# Patient Record
Sex: FEMALE | Race: NATIVE HAWAIIAN OR OTHER PACIFIC ISLANDER | NOT HISPANIC OR LATINO
[De-identification: names, ages, dates, MRNs, and addresses within clinical notes are randomized per-mention and may not be internally consistent; named-entity substitution may affect disease eponyms.]

---

## 2017-11-03 ENCOUNTER — TRANSCRIPTION ENCOUNTER (OUTPATIENT)
Age: 22
End: 2017-11-03

## 2018-08-13 ENCOUNTER — OUTPATIENT (OUTPATIENT)
Dept: OUTPATIENT SERVICES | Facility: HOSPITAL | Age: 23
LOS: 1 days | Discharge: HOME | End: 2018-08-13

## 2018-08-13 DIAGNOSIS — Z76.1 ENCOUNTER FOR HEALTH SUPERVISION AND CARE OF FOUNDLING: ICD-10-CM

## 2018-10-19 ENCOUNTER — OUTPATIENT (OUTPATIENT)
Dept: OUTPATIENT SERVICES | Facility: HOSPITAL | Age: 23
LOS: 1 days | Discharge: HOME | End: 2018-10-19

## 2018-10-19 DIAGNOSIS — Z76.1 ENCOUNTER FOR HEALTH SUPERVISION AND CARE OF FOUNDLING: ICD-10-CM

## 2018-10-19 DIAGNOSIS — K29.71 GASTRITIS, UNSPECIFIED, WITH BLEEDING: ICD-10-CM

## 2018-11-03 ENCOUNTER — TRANSCRIPTION ENCOUNTER (OUTPATIENT)
Age: 23
End: 2018-11-03

## 2018-11-12 ENCOUNTER — OUTPATIENT (OUTPATIENT)
Dept: OUTPATIENT SERVICES | Facility: HOSPITAL | Age: 23
LOS: 1 days | Discharge: HOME | End: 2018-11-12

## 2018-11-12 DIAGNOSIS — M54.5 LOW BACK PAIN: ICD-10-CM

## 2018-11-12 DIAGNOSIS — M25.512 PAIN IN LEFT SHOULDER: ICD-10-CM

## 2018-11-12 DIAGNOSIS — M54.2 CERVICALGIA: ICD-10-CM

## 2021-02-01 ENCOUNTER — APPOINTMENT (OUTPATIENT)
Dept: BREAST CENTER | Facility: CLINIC | Age: 26
End: 2021-02-01

## 2021-02-18 ENCOUNTER — APPOINTMENT (OUTPATIENT)
Dept: BREAST CENTER | Facility: CLINIC | Age: 26
End: 2021-02-18
Payer: COMMERCIAL

## 2021-02-18 VITALS
SYSTOLIC BLOOD PRESSURE: 107 MMHG | OXYGEN SATURATION: 97 % | HEART RATE: 66 BPM | TEMPERATURE: 98.1 F | WEIGHT: 170 LBS | HEIGHT: 63 IN | DIASTOLIC BLOOD PRESSURE: 70 MMHG | BODY MASS INDEX: 30.12 KG/M2

## 2021-02-18 DIAGNOSIS — Z80.3 FAMILY HISTORY OF MALIGNANT NEOPLASM OF BREAST: ICD-10-CM

## 2021-02-18 DIAGNOSIS — N63.0 UNSPECIFIED LUMP IN UNSPECIFIED BREAST: ICD-10-CM

## 2021-02-18 DIAGNOSIS — Z78.9 OTHER SPECIFIED HEALTH STATUS: ICD-10-CM

## 2021-02-18 PROCEDURE — 99203 OFFICE O/P NEW LOW 30 MIN: CPT

## 2021-02-18 PROCEDURE — 99072 ADDL SUPL MATRL&STAF TM PHE: CPT

## 2021-02-18 RX ORDER — IRON/IRON ASP GLY/FA/MV-MIN 38 125-25-1MG
TABLET ORAL
Refills: 0 | Status: ACTIVE | COMMUNITY

## 2021-02-18 RX ORDER — CHROMIUM 200 MCG
TABLET ORAL
Refills: 0 | Status: ACTIVE | COMMUNITY

## 2021-02-18 NOTE — PAST MEDICAL HISTORY
[Menstruating] : The patient is menstruating [Menarche Age ____] : age at menarche was [unfilled] [Approximately ___] : the LMP was approximately [unfilled] [Total Preg ___] : G[unfilled] [Regular Cycle Intervals] : have been regular [FreeTextEntry6] : NA [FreeTextEntry7] : NA [FreeTextEntry8] : NA

## 2021-02-23 PROBLEM — Z78.9 NON-SMOKER: Status: ACTIVE | Noted: 2021-02-18

## 2021-03-04 ENCOUNTER — NON-APPOINTMENT (OUTPATIENT)
Age: 26
End: 2021-03-04

## 2021-03-08 ENCOUNTER — NON-APPOINTMENT (OUTPATIENT)
Age: 26
End: 2021-03-08

## 2021-08-03 ENCOUNTER — NON-APPOINTMENT (OUTPATIENT)
Age: 26
End: 2021-08-03

## 2021-08-19 ENCOUNTER — NON-APPOINTMENT (OUTPATIENT)
Age: 26
End: 2021-08-19

## 2021-08-19 ENCOUNTER — APPOINTMENT (OUTPATIENT)
Dept: BREAST CENTER | Facility: CLINIC | Age: 26
End: 2021-08-19
Payer: COMMERCIAL

## 2021-08-19 VITALS
BODY MASS INDEX: 30.12 KG/M2 | DIASTOLIC BLOOD PRESSURE: 83 MMHG | SYSTOLIC BLOOD PRESSURE: 125 MMHG | HEIGHT: 63 IN | TEMPERATURE: 97.8 F | WEIGHT: 170 LBS | OXYGEN SATURATION: 98 % | HEART RATE: 81 BPM

## 2021-08-19 PROCEDURE — 99213 OFFICE O/P EST LOW 20 MIN: CPT

## 2021-08-19 NOTE — PAST MEDICAL HISTORY
[Menstruating] : The patient is menstruating [Menarche Age ____] : age at menarche was [unfilled] [Approximately ___] : the LMP was approximately [unfilled] [Regular Cycle Intervals] : have been regular [Total Preg ___] : G[unfilled] [FreeTextEntry7] : NA [FreeTextEntry6] : NA [FreeTextEntry8] : NA

## 2021-08-19 NOTE — PHYSICAL EXAM
[Normocephalic] : normocephalic [Atraumatic] : atraumatic [Supple] : supple [No Supraclavicular Adenopathy] : no supraclavicular adenopathy [Examined in the supine and seated position] : examined in the supine and seated position [No dominant masses] : no dominant masses left breast [No Nipple Retraction] : no left nipple retraction [No Nipple Discharge] : no right nipple discharge [No Axillary Lymphadenopathy] : no left axillary lymphadenopathy [No Edema] : no edema [No Rashes] : no rashes [No Ulceration] : no ulceration

## 2021-08-19 NOTE — HISTORY OF PRESENT ILLNESS
[FreeTextEntry1] : Kerline is a 26 year old female who presents for follow-up high risk evaluation regarding a strong family history of breast cancer, her paternal aunt and paternal grandmother were diagnosed with breast cancer at ages 45 and 50 respectively. No genetic testing has been completed. She denies any palpable abnormalities, no skin changes/dimpling, no nipple discharge bilaterally. \par \par SERGEY 22.5% (prior to genetic testing) was recalculated to 21.2% with negative pathogenic mutations (she was positive for a VUS in BRCA2)\par \par Will start MRIs now.

## 2021-08-19 NOTE — DATA REVIEWED
[No studies available for review at this time.] : No studies available for review at this time. [FreeTextEntry1] : 2/25/21 ([facility]) genetic testing revealed a VUS in BRCA2. \par \par 3/2/21 [facility] bilateral breast US: Negative bilateral US. BI-RADS 1.

## 2021-10-04 ENCOUNTER — NON-APPOINTMENT (OUTPATIENT)
Age: 26
End: 2021-10-04

## 2022-03-04 ENCOUNTER — TRANSCRIPTION ENCOUNTER (OUTPATIENT)
Age: 27
End: 2022-03-04

## 2022-03-09 ENCOUNTER — NON-APPOINTMENT (OUTPATIENT)
Age: 27
End: 2022-03-09

## 2022-04-20 ENCOUNTER — NON-APPOINTMENT (OUTPATIENT)
Age: 27
End: 2022-04-20

## 2022-04-21 ENCOUNTER — APPOINTMENT (OUTPATIENT)
Dept: BREAST CENTER | Facility: CLINIC | Age: 27
End: 2022-04-21
Payer: MEDICAID

## 2022-04-21 VITALS
HEIGHT: 63 IN | OXYGEN SATURATION: 100 % | SYSTOLIC BLOOD PRESSURE: 122 MMHG | HEART RATE: 64 BPM | WEIGHT: 170 LBS | BODY MASS INDEX: 30.12 KG/M2 | DIASTOLIC BLOOD PRESSURE: 79 MMHG | TEMPERATURE: 98 F

## 2022-04-21 PROCEDURE — 99213 OFFICE O/P EST LOW 20 MIN: CPT

## 2022-04-21 NOTE — PAST MEDICAL HISTORY
[Menstruating] : The patient is menstruating [Menarche Age ____] : age at menarche was [unfilled] [Approximately ___] : the LMP was approximately [unfilled] [Regular Cycle Intervals] : have been regular [Total Preg ___] : G[unfilled] [FreeTextEntry6] : NA [FreeTextEntry7] : NA [FreeTextEntry8] : NA

## 2022-04-21 NOTE — DATA REVIEWED
[No studies available for review at this time.] : No studies available for review at this time. [FreeTextEntry1] : 2/25/21 ([facility]) genetic testing revealed a VUS in BRCA2. \par \par 3/2/21 ([facility]) bilateral breast US: Negative bilateral US. BI-RADS 1. \par \par 09/28/2021 ([facility]): MRI- Fibroglandular. Negative. BIRADS 1. \par \par 4/20/2022 ([facility]) bilateral breast US showing no suspicious abnormalities were seen sonographically in either breast, negative BIRADS 1 \par

## 2022-04-21 NOTE — HISTORY OF PRESENT ILLNESS
[FreeTextEntry1] : Kerline is a 26 year old female who presents for follow-up high risk evaluation regarding a strong family history of breast cancer, her paternal aunt and paternal grandmother were diagnosed with breast cancer at ages 45 and 50 respectively. No genetic testing has been completed. She denies any palpable abnormalities, no skin changes/dimpling, no nipple discharge bilaterally. \par \par SERGEY 22.5% (prior to genetic testing) was recalculated to 21.2% with negative pathogenic mutations (she was positive for a VUS in BRCA2)\par \par

## 2022-10-11 NOTE — HISTORY OF PRESENT ILLNESS
[FreeTextEntry1] : ANAIS is a 25 year old female referred by Dr. Mohini Flowers (OB/GYN) who presents for initial evaluation regarding a strong family history of breast cancer, her paternal aunt and paternal grandmother were diagnosed with breast cancer at ages 45 and 50 respectively. No genetic testing has been completed. She denies any palpable abnormalities, no skin changes/dimpling, no nipple discharge bilaterally. \par \par SERGEY 22.5%\par \par Discussed patients high risk status and recommendations for close surveillance. Patient understands and would like to be followed closely.\par \par Discussed importance and implication of genetic testing in regards to her family history and offspring. Patient would like to go forward with genetic testing.\par \par Discussed benefits of surveillance and well as implication of the sensitivity of breast MRI. Patient understands and agrees to go forward with MRI for breast cancer surveillance when she is 27yo.\par \par Patient still start screening mammograms at age 36.
1

## 2022-10-20 ENCOUNTER — APPOINTMENT (OUTPATIENT)
Dept: BREAST CENTER | Facility: CLINIC | Age: 27
End: 2022-10-20

## 2022-10-20 VITALS
HEART RATE: 85 BPM | HEIGHT: 63 IN | BODY MASS INDEX: 32.07 KG/M2 | WEIGHT: 181 LBS | SYSTOLIC BLOOD PRESSURE: 109 MMHG | DIASTOLIC BLOOD PRESSURE: 76 MMHG

## 2022-10-20 PROCEDURE — 99213 OFFICE O/P EST LOW 20 MIN: CPT

## 2022-10-24 ENCOUNTER — NON-APPOINTMENT (OUTPATIENT)
Age: 27
End: 2022-10-24

## 2022-10-25 NOTE — HISTORY OF PRESENT ILLNESS
[FreeTextEntry1] : Kerline is a 26 year old female who presents for follow-up high risk evaluation regarding a strong family history of breast cancer, her paternal aunt and paternal grandmother were diagnosed with breast cancer at ages 45 and 50 respectively. No genetic testing has been completed. She denies any palpable abnormalities, no skin changes/dimpling, no nipple discharge bilaterally. \par \par SERGEY 21.2% genetic testing was negative for pathogenic mutations (she was positive for a VUS in BRCA2)\par \par

## 2022-11-23 ENCOUNTER — NON-APPOINTMENT (OUTPATIENT)
Age: 27
End: 2022-11-23

## 2023-04-05 ENCOUNTER — APPOINTMENT (OUTPATIENT)
Dept: BREAST CENTER | Facility: CLINIC | Age: 28
End: 2023-04-05

## 2023-04-07 ENCOUNTER — NON-APPOINTMENT (OUTPATIENT)
Age: 28
End: 2023-04-07

## 2023-04-14 ENCOUNTER — APPOINTMENT (OUTPATIENT)
Dept: BREAST CENTER | Facility: CLINIC | Age: 28
End: 2023-04-14
Payer: MEDICAID

## 2023-04-14 VITALS
BODY MASS INDEX: 31.36 KG/M2 | DIASTOLIC BLOOD PRESSURE: 74 MMHG | HEIGHT: 63 IN | WEIGHT: 177 LBS | SYSTOLIC BLOOD PRESSURE: 112 MMHG | OXYGEN SATURATION: 96 % | HEART RATE: 65 BPM

## 2023-04-14 DIAGNOSIS — R92.2 INCONCLUSIVE MAMMOGRAM: ICD-10-CM

## 2023-04-14 PROCEDURE — 99213 OFFICE O/P EST LOW 20 MIN: CPT

## 2023-04-14 NOTE — PAST MEDICAL HISTORY
[Menstruating] : The patient is menstruating [Menarche Age ____] : age at menarche was [unfilled] [Regular Cycle Intervals] : have been regular [Total Preg ___] : G[unfilled] [Definite ___ (Date)] : the last menstrual period was [unfilled] [History of Hormone Replacement Treatment] : has no history of hormone replacement treatment [FreeTextEntry6] : NA [FreeTextEntry7] : NA [FreeTextEntry8] : NA

## 2023-04-14 NOTE — PHYSICAL EXAM
[Normocephalic] : normocephalic [Supple] : supple [No Supraclavicular Adenopathy] : no supraclavicular adenopathy [Examined in the supine and seated position] : examined in the supine and seated position [No dominant masses] : no dominant masses in right breast  [No dominant masses] : no dominant masses left breast [No Nipple Retraction] : no left nipple retraction [No Nipple Discharge] : no left nipple discharge [No Axillary Lymphadenopathy] : no left axillary lymphadenopathy [No Edema] : no edema [No Swelling] : no swelling [Full ROM] : full range of motion [No Rashes] : no rashes [No Ulceration] : no ulceration [de-identified] : left 2:00 4-5cmFN 2cm palpable density, left 11:00 4cmFN palpable density, no dominant masses

## 2023-04-14 NOTE — HISTORY OF PRESENT ILLNESS
[FreeTextEntry1] : Kerline is a 27 year old female who presents for follow-up high risk screening regarding a strong family history of breast cancer, her paternal aunt and paternal grandmother were diagnosed with breast cancer at ages 45 and 50 respectively. Patient reports a history of b/l lumpy tissue, but denies any dominant palpable abnormalities, no skin changes/dimpling, no nipple discharge bilaterally. \par \par SERGEY 21.2%; panel genetic testing completed in 2021 yielding a VUS in BRCA2. Patient states she is s/p genetic counseling consult with [facility] and felt the consult was comprehensive regarding her cancer screenings and results. \par \par Patient states she is under the care of a gynecologist, but is looking to establish care with a new provider. \par \par \par \par \par \par

## 2023-04-14 NOTE — DATA REVIEWED
[No studies available for review at this time.] : No studies available for review at this time. [FreeTextEntry1] : 2/25/21 ([facility]) genetic testing revealed a VUS in BRCA2. \par \par 3/2/21 ([facility]) bilateral breast US: Negative bilateral US. BI-RADS 1. \par \par 09/28/2021 ([facility]): MRI- Fibroglandular. Negative. BIRADS 1. \par \par 4/20/2022 ([facility]) bilateral breast US showing no suspicious abnormalities were seen sonographically in either breast, negative BIRADS 1 \par  \par 11/22/22 ([facility]) b/l breast MRI: no MRI evidence of malignancy, benign BIRADS 2

## 2023-05-02 ENCOUNTER — APPOINTMENT (OUTPATIENT)
Dept: OBGYN | Facility: CLINIC | Age: 28
End: 2023-05-02

## 2023-05-16 ENCOUNTER — NON-APPOINTMENT (OUTPATIENT)
Age: 28
End: 2023-05-16

## 2023-06-01 ENCOUNTER — NON-APPOINTMENT (OUTPATIENT)
Age: 28
End: 2023-06-01

## 2023-06-12 ENCOUNTER — APPOINTMENT (OUTPATIENT)
Dept: OBGYN | Facility: CLINIC | Age: 28
End: 2023-06-12
Payer: MEDICAID

## 2023-06-12 ENCOUNTER — NON-APPOINTMENT (OUTPATIENT)
Age: 28
End: 2023-06-12

## 2023-06-12 DIAGNOSIS — N76.0 ACUTE VAGINITIS: ICD-10-CM

## 2023-06-12 PROCEDURE — 99203 OFFICE O/P NEW LOW 30 MIN: CPT

## 2023-06-12 NOTE — DISCUSSION/SUMMARY
[FreeTextEntry1] : 26 yo for vaginitis after antibiotics\par - f/u [facility]\par - discussed in future can drop off [facility] for testing prior to antibiotic admin\par -f/u 10/2023 for [facility]

## 2023-06-12 NOTE — HISTORY OF PRESENT ILLNESS
[FreeTextEntry1] : 26 yo G0 for follow up after UTI. She had dysuria and frequency, took fluconazole w/o improvement and then macrobid after which symptoms improved. Currently reports thick white discharge, denies itching, burning or odor. \par H/o recurrent UTI's, reports 3 x in 2022, unrelated to sexual activity. \par Last pap smear 10/2022, normal per patient. \par Periods are regular w/o complaints, LMP 5/29/2023. \par She has been sexually active in the past but is not currently sexually active. \par Denies h/o cysts, fibroids, STD's.

## 2023-06-12 NOTE — PHYSICAL EXAM
[Labia Majora] : normal [Labia Minora] : normal [Discharge] : a  ~M vaginal discharge was present [White] : white [Cheesy] : cheesy [No Bleeding] : There was no active vaginal bleeding [Normal] : normal

## 2023-06-27 ENCOUNTER — TRANSCRIPTION ENCOUNTER (OUTPATIENT)
Age: 28
End: 2023-06-27

## 2023-08-07 ENCOUNTER — NON-APPOINTMENT (OUTPATIENT)
Age: 28
End: 2023-08-07

## 2023-09-06 ENCOUNTER — APPOINTMENT (OUTPATIENT)
Dept: OBGYN | Facility: CLINIC | Age: 28
End: 2023-09-06

## 2023-09-07 ENCOUNTER — APPOINTMENT (OUTPATIENT)
Dept: OBGYN | Facility: CLINIC | Age: 28
End: 2023-09-07
Payer: MEDICAID

## 2023-09-07 VITALS — BODY MASS INDEX: 30.12 KG/M2 | WEIGHT: 170 LBS | HEIGHT: 63 IN

## 2023-09-07 DIAGNOSIS — N92.6 IRREGULAR MENSTRUATION, UNSPECIFIED: ICD-10-CM

## 2023-09-07 DIAGNOSIS — Z00.00 ENCOUNTER FOR GENERAL ADULT MEDICAL EXAMINATION W/OUT ABNORMAL FINDINGS: ICD-10-CM

## 2023-09-07 LAB
BILIRUB UR QL STRIP: NORMAL
GLUCOSE UR-MCNC: NORMAL
HCG UR QL: 0.2 EU/DL
HGB UR QL STRIP.AUTO: NORMAL
KETONES UR-MCNC: NORMAL
LEUKOCYTE ESTERASE UR QL STRIP: NORMAL
NITRITE UR QL STRIP: NORMAL
PH UR STRIP: 7.5
PROT UR STRIP-MCNC: NORMAL
SP GR UR STRIP: 1.02

## 2023-09-07 PROCEDURE — 81003 URINALYSIS AUTO W/O SCOPE: CPT | Mod: QW

## 2023-09-07 PROCEDURE — 99214 OFFICE O/P EST MOD 30 MIN: CPT | Mod: 25

## 2023-09-07 NOTE — DISCUSSION/SUMMARY
[FreeTextEntry1] : 27 yo G0, irregular menses.  - f/up vaginitis - sent for STI screening and hormonal panel, hcg - discussed missed period can be a result of recent plan B, stress, etc - discussed multiple birth control options. Will start xulane patches. Discussed appropriate usage. No personal/family hx VTE - return to office 6 months refill and annual exam

## 2023-09-07 NOTE — HISTORY OF PRESENT ILLNESS
[FreeTextEntry1] : 27 yo G0 w/ LMP 6/23/23 here for consultation. Reports typically having regular menses. They are heavy and painful. Had unprotected intercourse 7/4 and took plan B 7/5. No menses since then. Took >10 upregs, all negative. Started light spotting a few days ago. No cramping. No urinary symptoms. No bowel issues. Irregularly sexually active. Used nuvaring in past and did not like it.  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 Had genetic testing and has variant of undetermined significance. Sees a breast surgeon for routine MRI/US

## 2023-09-08 ENCOUNTER — NON-APPOINTMENT (OUTPATIENT)
Age: 28
End: 2023-09-08

## 2023-09-08 LAB
ESTRADIOL SERPL-MCNC: 49 PG/ML
FSH SERPL-MCNC: 5.5 IU/L
HBV SURFACE AG SER QL: NONREACTIVE
HCG SERPL-MCNC: <1 MIU/ML
HCV AB SER QL: NONREACTIVE
HCV S/CO RATIO: 0.11 S/CO
HIV1+2 AB SPEC QL IA.RAPID: NONREACTIVE
PROLACTIN SERPL-MCNC: 13.2 NG/ML
T PALLIDUM AB SER QL IA: NEGATIVE
TSH SERPL-ACNC: 1.06 UIU/ML

## 2023-10-09 ENCOUNTER — APPOINTMENT (OUTPATIENT)
Dept: OBGYN | Facility: CLINIC | Age: 28
End: 2023-10-09

## 2023-11-13 ENCOUNTER — NON-APPOINTMENT (OUTPATIENT)
Age: 28
End: 2023-11-13

## 2023-11-17 ENCOUNTER — APPOINTMENT (OUTPATIENT)
Dept: BREAST CENTER | Facility: CLINIC | Age: 28
End: 2023-11-17

## 2023-12-08 ENCOUNTER — NON-APPOINTMENT (OUTPATIENT)
Age: 28
End: 2023-12-08

## 2023-12-28 ENCOUNTER — NON-APPOINTMENT (OUTPATIENT)
Age: 28
End: 2023-12-28

## 2024-01-12 ENCOUNTER — RX RENEWAL (OUTPATIENT)
Age: 29
End: 2024-01-12

## 2024-01-12 RX ORDER — NORELGESTROMIN AND ETHINYL ESTRADIOL 150; 35 UG/D; UG/D
150-35 PATCH TRANSDERMAL
Qty: 9 | Refills: 0 | Status: ACTIVE | COMMUNITY
Start: 2023-09-07 | End: 1900-01-01

## 2024-01-23 ENCOUNTER — NON-APPOINTMENT (OUTPATIENT)
Age: 29
End: 2024-01-23

## 2024-02-21 ENCOUNTER — TRANSCRIPTION ENCOUNTER (OUTPATIENT)
Age: 29
End: 2024-02-21

## 2024-02-21 ENCOUNTER — NON-APPOINTMENT (OUTPATIENT)
Age: 29
End: 2024-02-21

## 2024-02-22 ENCOUNTER — NON-APPOINTMENT (OUTPATIENT)
Age: 29
End: 2024-02-22

## 2024-03-08 ENCOUNTER — NON-APPOINTMENT (OUTPATIENT)
Age: 29
End: 2024-03-08

## 2024-03-15 ENCOUNTER — APPOINTMENT (OUTPATIENT)
Dept: BREAST CENTER | Facility: CLINIC | Age: 29
End: 2024-03-15
Payer: COMMERCIAL

## 2024-03-15 VITALS
WEIGHT: 172 LBS | DIASTOLIC BLOOD PRESSURE: 84 MMHG | BODY MASS INDEX: 30.48 KG/M2 | HEART RATE: 83 BPM | HEIGHT: 63 IN | SYSTOLIC BLOOD PRESSURE: 121 MMHG

## 2024-03-15 DIAGNOSIS — Z80.3 FAMILY HISTORY OF MALIGNANT NEOPLASM OF BREAST: ICD-10-CM

## 2024-03-15 DIAGNOSIS — Z91.89 OTHER SPECIFIED PERSONAL RISK FACTORS, NOT ELSEWHERE CLASSIFIED: ICD-10-CM

## 2024-03-15 PROCEDURE — 99213 OFFICE O/P EST LOW 20 MIN: CPT

## 2024-03-15 NOTE — ASSESSMENT
[FreeTextEntry1] : 28-year-old female, at high risk for breast cancer, Sergey lifetime risk of 21.2%.  Reviewed with the patient her clinical breast exam findings today, no palpable abnormality was noted in the patient's region of concern in the right breast 12:00 region.  Reviewed with the patient her bilateral breast MRI results completed on 2/21/2024 which were negative BI-RADS 1.    Reviewed NCCN guidelines in detail for the patient elevated risk for breast cancer. Reviewed patients SERGEY of 21.2% with the option to continue MRI screenings annually vs re-starting screenings at age 36 based upon guidelines. Patient states that she was initially seen in the office as she has anxiety related to her strong family history of breast cancer and would prefer to continue high risk screenings by MRI.  Discussed the plan to proceed with a bilateral breast MRI for high risk screening in February 2025 and follow-up after completion of the imaging.    Reviewed patients family history, reviewed panel genetic test results with VUS in BRCA 2. Discussed per clinvar database as of 2024, patients VUS remains as such.  Discussed self breast exams with the patient. Recommended simple pattern of palpation, while seated and while laying down. Recommended that she performs these breast exams at the same time every month, as to time it with her cycle. Discussed characteristics of a suspicious mass, such as irregular, hard like a rock and fixed/immobile. Patient understands.

## 2024-03-15 NOTE — HISTORY OF PRESENT ILLNESS
[FreeTextEntry1] : Kerline is a 28 year old female who presents for follow-up high risk screening regarding a strong family history of breast cancer, her paternal aunt and paternal grandmother were diagnosed with breast cancer at ages 45 and 50 respectively.  Patient states that she felt a lump in her right breast 12:00 region about 2 weeks ago.  Denies skin changes/dimpling, no nipple discharge bilaterally.  The patient underwent a bilateral breast MRI on 2/21/2024 with negative BI-RADS 1 findings.  SERGEY 21.2%; panel genetic testing completed in 2021 yielding a VUS in BRCA2. Patient states she is s/p genetic counseling consult with [facility] and felt the consult was comprehensive regarding her cancer screenings and results.

## 2024-03-15 NOTE — PLAN
[TextEntry] : Bilateral breast MRI February 2025 Follow-up February 2025 Self breast exams as instructed in the office

## 2024-03-15 NOTE — DATA REVIEWED
[No studies available for review at this time.] : No studies available for review at this time. [FreeTextEntry1] : 2/25/21 ([facility]) genetic testing revealed a VUS in BRCA2.   3/2/21 ([facility]) bilateral breast US: Negative bilateral US. BI-RADS 1.   09/28/2021 ([facility]): MRI- Fibroglandular. Negative. BIRADS 1.   4/20/2022 ([facility]) bilateral breast US showing no suspicious abnormalities were seen sonographically in either breast, negative BIRADS 1    11/22/22 ([facility]) b/l breast MRI: no MRI evidence of malignancy, benign BIRADS 2   5/15/23 ([facility]) left breast US: no sonographic evidence of malignancy. Negative BIRADS 1.  2/21/2024 [facility]) bilateral breast MRI with and without contrast: No MRI evidence of malignancy, negative BI-RADS 1

## 2024-03-15 NOTE — PAST MEDICAL HISTORY
[Menstruating] : The patient is menstruating [Menarche Age ____] : age at menarche was [unfilled] [Definite ___ (Date)] : the last menstrual period was [unfilled] [Regular Cycle Intervals] : have been regular [Total Preg ___] : G[unfilled] [History of Hormone Replacement Treatment] : has no history of hormone replacement treatment [FreeTextEntry6] : NA [FreeTextEntry7] : NA [FreeTextEntry8] : NA

## 2024-03-15 NOTE — PHYSICAL EXAM
[Normocephalic] : normocephalic [Supple] : supple [No Supraclavicular Adenopathy] : no supraclavicular adenopathy [Examined in the supine and seated position] : examined in the supine and seated position [No dominant masses] : no dominant masses in right breast  [No dominant masses] : no dominant masses left breast [No Nipple Retraction] : no right nipple retraction [No Nipple Discharge] : no right nipple discharge [No Axillary Lymphadenopathy] : no left axillary lymphadenopathy [No Edema] : no edema [No Swelling] : no swelling [No Ulceration] : no ulceration [No Rashes] : no rashes [de-identified] : UOQ dense tissue, no dominant masses [de-identified] : UOQ dense tissue, no dominant masses

## 2024-08-12 ENCOUNTER — NON-APPOINTMENT (OUTPATIENT)
Age: 29
End: 2024-08-12

## 2024-08-24 ENCOUNTER — NON-APPOINTMENT (OUTPATIENT)
Age: 29
End: 2024-08-24

## 2025-03-07 ENCOUNTER — EMERGENCY (EMERGENCY)
Facility: HOSPITAL | Age: 30
LOS: 0 days | Discharge: HOME | End: 2025-03-07
Attending: EMERGENCY MEDICINE
Payer: COMMERCIAL

## 2025-03-07 VITALS
HEIGHT: 65 IN | HEART RATE: 79 BPM | TEMPERATURE: 98 F | SYSTOLIC BLOOD PRESSURE: 119 MMHG | RESPIRATION RATE: 18 BRPM | OXYGEN SATURATION: 98 % | WEIGHT: 169.98 LBS | DIASTOLIC BLOOD PRESSURE: 82 MMHG

## 2025-03-07 DIAGNOSIS — Z53.21 PROCEDURE AND TREATMENT NOT CARRIED OUT DUE TO PATIENT LEAVING PRIOR TO BEING SEEN BY HEALTH CARE PROVIDER: ICD-10-CM

## 2025-03-07 PROCEDURE — L9991: CPT

## 2025-03-07 PROCEDURE — 99281 EMR DPT VST MAYX REQ PHY/QHP: CPT

## 2025-03-07 NOTE — ED ADULT TRIAGE NOTE - TEMPERATURE IN FAHRENHEIT (DEGREES F)
24H events:    Patient is a 71y old Male who presents with a chief complaint of Mediastinal Mass (21 Feb 2024 11:01)    Primary diagnosis of Mediastinal mass    Today is hospital day 1d.   Speech and swallow evaluated patient, recommended easy to chew but hold until GI evaluation given CT findings.   Pending CT surgery.   EGD today with GI.   Patient apparently had malignancy workup at [facility], will get records.       PAST MEDICAL & SURGICAL HISTORY  HTN (hypertension)    Prostate cancer      SOCIAL HISTORY:  Social History:      ALLERGIES:  No Known Allergies    MEDICATIONS:  STANDING MEDICATIONS  dextrose 5% + lactated ringers. 1000 milliLiter(s) IV Continuous <Continuous>  enoxaparin Injectable 40 milliGRAM(s) SubCutaneous every 24 hours  influenza  Vaccine (HIGH DOSE) 0.7 milliLiter(s) IntraMuscular once    PRN MEDICATIONS  acetaminophen     Tablet .. 650 milliGRAM(s) Oral every 6 hours PRN  aluminum hydroxide/magnesium hydroxide/simethicone Suspension 30 milliLiter(s) Oral every 4 hours PRN  melatonin 3 milliGRAM(s) Oral at bedtime PRN  ondansetron Injectable 4 milliGRAM(s) IV Push every 8 hours PRN    VITALS:   T(F): 97.8  HR: 93  BP: 109/73  RR: 18  SpO2: 97%    PHYSICAL EXAM:    GENERAL: NAD, non-toxic appearing  NECK: Supple, no stiffness, no JVD, no thyromegaly   HEART: Regular rate and rhythm, normal s1s2  LUNGS: Unlabored respirations, b/l air entry, no adventitious breath sounds   ABDOMEN: Soft, nontender, nondistended; +BS  EXTREMITIES: No clubbing, cyanosis, or edema; 2+ peripheral pulses   NERVOUS SYSTEM: AOx3  SKIN: Warm and dry, no rashes or lesions      LABS:                        10.0   4.88  )-----------( 282      ( 21 Feb 2024 07:21 )             32.5     02-21    140  |  109  |  21<H>  ----------------------------<  104<H>  5.0   |  18  |  1.3    Ca    8.2<L>      21 Feb 2024 07:21    TPro  6.5  /  Alb  4.1  /  TBili  0.4  /  DBili  x   /  AST  21  /  ALT  13  /  AlkPhos  66  02-20      Urinalysis Basic - ( 21 Feb 2024 07:21 )    Color: x / Appearance: x / SG: x / pH: x  Gluc: 104 mg/dL / Ketone: x  / Bili: x / Urobili: x   Blood: x / Protein: x / Nitrite: x   Leuk Esterase: x / RBC: x / WBC x   Sq Epi: x / Non Sq Epi: x / Bacteria: x        RADIOLOGY:  < from: CT Chest w/ IV Cont (02.20.24 @ 13:58) >      FINDINGS:    TUBES/LINES: None.    LUNGS, PLEURA, AND AIRWAYS: There are small bilateral pleural effusions.   The left pleural effusion is similar to the prior exam. No lobar   consolidation or pneumothorax is present. No evidence of endobronchial   obstruction, bronchiectasis or honeycombing.    MEDIASTINUM/LYMPH NODES: There is a large posterior/central mediastinal   mass extending from the subcarinal region and surrounding the aorta and   distal esophagus. It measures approximately 11.7 x 10.5 x 5.7 cm at the   level of the pulmonary trunk. There are associated enlarged paratracheal   and prevascular lymph nodes measuring up to 3 cm. These nodes are much   larger since the prior CT of 1/11/2023. Perihilar enlarged lymph nodes   are noted as well. Some area of this mass demonstrate hypodensities   likely reflecting central necrosis. An oval density is noted within the   mass at the level of the ember and may reflect a pill within the   esophagus. The more proximal esophagus is distended with layering debris   within.    HEART/GREAT VESSELS: No pericardial effusion. No pulmonary embolus. Heart   size unremarkable. The left atrium is mildly compressed by the   mediastinal mass Coronary artery and thoracic aorta atherosclerotic   calcifications. No aneurysmal dilation of the thoracic aorta.    BONES/SOFT TISSUES: No acute osseous abnormality. No definite lytic or   blastic lesion is seen.    VISUALIZED UPPER ABDOMEN: Upper abdominal superior mesenteric mass in the   region of the SMA measuring up to 5 x 5 cm. There is delayed left   nephrogram, partially included nodular thickening of the left adrenal.    OTHER: Thyroid gland is unremarkable.      IMPRESSION:      No evidence of pulmonary embolus    Interval enlargement of the central/posterior mediastinal mass with   development of more extensive lymphadenopathy. The mass encircles the   descending thoracic aorta and has some mass effect upon the left atrium.   The mid aspect of the esophagus is encircled by the mass and difficult to   distinguish. Possible ovoid pill is noted within the mass and possible   location of the mid esophagus appear. The maximal esophagus appears   distended with layering debris within. Consideration may be given to   esophageal stenting.                   98.2
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
## 2025-03-07 NOTE — ED ADULT NURSE NOTE - NSFALLRISKASMTTYPE_ED_ALL_ED
[facility] URGENT CARE COURSE:    Gregg was seen today for fever.    Diagnoses and all orders for this visit:    Acute suppurative otitis media of left ear with spontaneous rupture of tympanic membrane, recurrence not specified  -     cefdinir (OMNICEF) 250 MG/5ML suspension; Take 3.3 mLs by mouth every 12 hours for 10 days.  -     ciprofloxacin-dexamethasone (Ciprodex) otic suspension; 4-5 drops in Affected Ear(s), BID for 7 days    Fever, unspecified fever cause  -     COVID/Flu/RSV panel  -     Streptococcus group A PCR    Strep pharyngitis  -     cefdinir (OMNICEF) 250 MG/5ML suspension; Take 3.3 mLs by mouth every 12 hours for 10 days.               Plan:  Recommend plenty of fluids, hydration orally. . Rest. Avoid undue exertion. Watch for any increasing/worsening symptoms.  Recommendation to follow with the primary care physician in next 2-7 days. If the symptoms continues or  worsen contact primary care physician or  recommend patient go to the emergency room.     Dr. Mary Gtz M.D.  [facility]  [address] [address] [place] [address]  Telephone:  [phone]        
Initial (On Arrival)

## 2025-03-08 ENCOUNTER — EMERGENCY (EMERGENCY)
Facility: HOSPITAL | Age: 30
LOS: 0 days | Discharge: HOME | End: 2025-03-08
Attending: EMERGENCY MEDICINE
Payer: COMMERCIAL

## 2025-03-08 ENCOUNTER — NON-APPOINTMENT (OUTPATIENT)
Age: 30
End: 2025-03-08

## 2025-03-08 VITALS
OXYGEN SATURATION: 99 % | DIASTOLIC BLOOD PRESSURE: 82 MMHG | HEART RATE: 79 BPM | SYSTOLIC BLOOD PRESSURE: 124 MMHG | TEMPERATURE: 99 F | WEIGHT: 169.98 LBS | HEIGHT: 65 IN | RESPIRATION RATE: 18 BRPM

## 2025-03-08 DIAGNOSIS — R11.0 NAUSEA: ICD-10-CM

## 2025-03-08 DIAGNOSIS — R07.89 OTHER CHEST PAIN: ICD-10-CM

## 2025-03-08 DIAGNOSIS — R42 DIZZINESS AND GIDDINESS: ICD-10-CM

## 2025-03-08 DIAGNOSIS — R51.9 HEADACHE, UNSPECIFIED: ICD-10-CM
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
## 2025-03-08 DIAGNOSIS — M25.561 PAIN IN RIGHT KNEE: ICD-10-CM
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
## 2025-03-08 PROCEDURE — 73564 X-RAY EXAM KNEE 4 OR MORE: CPT | Mod: RT

## 2025-03-08 PROCEDURE — 99283 EMERGENCY DEPT VISIT LOW MDM: CPT | Mod: 25

## 2025-03-08 PROCEDURE — 73564 X-RAY EXAM KNEE 4 OR MORE: CPT | Mod: 26,RT

## 2025-03-08 PROCEDURE — 99284 EMERGENCY DEPT VISIT MOD MDM: CPT

## 2025-03-12 ENCOUNTER — APPOINTMENT (OUTPATIENT)
Dept: BREAST CENTER | Facility: CLINIC | Age: 30
End: 2025-03-12